# Patient Record
Sex: FEMALE | Race: AMERICAN INDIAN OR ALASKA NATIVE
[De-identification: names, ages, dates, MRNs, and addresses within clinical notes are randomized per-mention and may not be internally consistent; named-entity substitution may affect disease eponyms.]

---

## 2022-01-07 ENCOUNTER — HOSPITAL ENCOUNTER (EMERGENCY)
Dept: HOSPITAL 5 - ED | Age: 67
Discharge: HOME | End: 2022-01-07
Payer: MEDICARE

## 2022-01-07 VITALS — DIASTOLIC BLOOD PRESSURE: 82 MMHG | SYSTOLIC BLOOD PRESSURE: 156 MMHG

## 2022-01-07 DIAGNOSIS — R06.02: Primary | ICD-10-CM

## 2022-01-07 PROCEDURE — 94644 CONT INHLJ TX 1ST HOUR: CPT

## 2022-01-07 PROCEDURE — 94640 AIRWAY INHALATION TREATMENT: CPT

## 2022-01-07 PROCEDURE — 99283 EMERGENCY DEPT VISIT LOW MDM: CPT

## 2022-01-07 PROCEDURE — 71046 X-RAY EXAM CHEST 2 VIEWS: CPT

## 2022-01-07 NOTE — EMERGENCY DEPARTMENT REPORT
ED Shortness of Breath HPI





- General


Chief Complaint: Dyspnea/Respdistress


Stated Complaint: FLU/COVID SYMPTOMS


Time Seen by Provider: 01/07/22 08:43


Source: patient


Mode of arrival: Ambulatory


Limitations: No Limitations





- History of Present Illness


Initial Comments: 





Patient presents with a 1 to 2-day history of respiratory symptoms including 

shortness of breath.  She has had a slight cough with minimal congestion.  She 

has had no fever.  She has had subjective sweats and chills however.  She 

started to have trouble breathing yesterday.  This was with exertion, sitting 

still, and with speaking.  She states that she tried to go to work but could not

due to the dyspnea yesterday.  She came in today for evaluation.  She has had no

arthralgias or myalgias.  Patient denies GI upset.  There is no pain or swelling

in the legs.  There is no recent travel or injury.  She has had no known 

coronavirus exposure.  On the third, she was having upper respiratory symptoms 

and was tested for coronavirus which was reportedly negative.  She has had a flu

shot.  She has had a pneumonia shot.





- Related Data


                                    Allergies











Allergy/AdvReac Type Severity Reaction Status Date / Time


 


No Known Allergies Allergy   Unverified 01/07/22 08:30














ED Review of Systems


ROS: 


Stated complaint: FLU/COVID SYMPTOMS


Other details as noted in HPI





Comment: All other systems reviewed and negative


Constitutional: fever (Subjective)


Eyes: denies: vision change


ENT: denies: throat pain


Respiratory: see HPI


Cardiovascular: denies: chest pain


Endocrine: denies: unexplained weight loss


Gastrointestinal: denies: abdominal pain


Genitourinary: denies: dysuria


Musculoskeletal: denies: back pain


Skin: denies: rash


Neurological: denies: headache


Hematological/Lymphatic: denies: easy bruising





ED Past Medical Hx





- Past Medical History


Previous Medical History?: Yes


Hx Hypertension: Yes


Hx Diabetes: Yes





- Family History


Family history: hypertension





ED Physical Exam





- General


Limitations: No Limitations, Other (Pulse ox noted to)


General appearance: alert, in no apparent distress





- Head


Head exam: Present: atraumatic, normocephalic





- Eye


Eye exam: Present: normal appearance, EOMI





- ENT


ENT exam: Present: normal orophraynx, normal external ear exam





- Neck


Neck exam: Present: normal inspection.  Absent: meningismus





- Respiratory


Respiratory exam: Present: normal lung sounds bilaterally.  Absent: respiratory 

distress





- Cardiovascular


Cardiovascular Exam: Present: regular rate, normal rhythm.  Absent: JVD





- GI/Abdominal


GI/Abdominal exam: Present: soft.  Absent: distended, tenderness





- Extremities Exam


Extremities exam: Present: normal capillary refill.  Absent: pedal edema





- Back Exam


Back exam: Absent: CVA tenderness (R), CVA tenderness (L)





- Neurological Exam


Neurological exam: Present: alert, oriented X3, CN II-XII intact, normal gait.  

Absent: motor sensory deficit





- Psychiatric


Psychiatric exam: Present: normal affect, normal mood





- Skin


Skin exam: Present: warm, dry





ED Course


                                   Vital Signs











  01/07/22





  08:34


 


Temperature 97.5 F L


 


Pulse Rate 106 H


 


Respiratory 20





Rate 


 


Blood Pressure 144/87


 


O2 Sat by Pulse 96





Oximetry 














- Reevaluation(s)


Reevaluation #1: 





01/07/22 08:47


cxr ordered.


01/07/22 08:58


Old records noted.


Critical Care Time: No


Critical care attestation.: 


If time is entered above; I have spent that time in minutes in the direct care 

of this critically ill patient, excluding procedure time.








ED Disposition


Clinical Impression: 


 Shortness of breath





Disposition: 01 HOME / SELF CARE / HOMELESS


Is pt being admited?: No


Condition: Stable

## 2022-01-07 NOTE — XRAY REPORT
CHEST 2 VIEWS 



INDICATION:  dyspnea. Nausea, vomiting, heart/is and shortness of breath for 2 days



COMPARISON:  none



FINDINGS:

Support devices: None.



Heart: Within normal limits. 

Lungs/pleura: No acute air space or interstitial disease.  No pleural abnormality or pneumothorax.



Additional findings: None.



IMPRESSION:

No acute findings.



Signer Name: Ananda Pereira Jr, MD 

Signed: 1/7/2022 9:37 AM

Workstation Name: [id number]